# Patient Record
Sex: FEMALE | Race: WHITE | NOT HISPANIC OR LATINO | URBAN - NONMETROPOLITAN AREA
[De-identification: names, ages, dates, MRNs, and addresses within clinical notes are randomized per-mention and may not be internally consistent; named-entity substitution may affect disease eponyms.]

---

## 2019-04-25 ENCOUNTER — APPOINTMENT (RX ONLY)
Dept: URBAN - NONMETROPOLITAN AREA CLINIC 13 | Facility: CLINIC | Age: 69
Setting detail: DERMATOLOGY
End: 2019-04-25

## 2019-04-25 DIAGNOSIS — Z41.9 ENCOUNTER FOR PROCEDURE FOR PURPOSES OTHER THAN REMEDYING HEALTH STATE, UNSPECIFIED: ICD-10-CM

## 2019-04-25 PROCEDURE — ? COSMETIC CONSULTATION - MICRO-NEEDLING

## 2019-04-25 PROCEDURE — ? MEDICAL CONSULTATION: BBL

## 2019-04-25 PROCEDURE — ? COSMETIC CONSULTATION: BOTULINUM TOXIN

## 2019-04-25 PROCEDURE — ? MEDICAL CONSULTATION: SKIN TIGHTENING

## 2019-05-09 ENCOUNTER — APPOINTMENT (RX ONLY)
Dept: URBAN - NONMETROPOLITAN AREA CLINIC 13 | Facility: CLINIC | Age: 69
Setting detail: DERMATOLOGY
End: 2019-05-09

## 2019-05-09 DIAGNOSIS — Z41.9 ENCOUNTER FOR PROCEDURE FOR PURPOSES OTHER THAN REMEDYING HEALTH STATE, UNSPECIFIED: ICD-10-CM

## 2019-05-09 PROCEDURE — ? FACIAL

## 2019-05-09 ASSESSMENT — LOCATION ZONE DERM: LOCATION ZONE: FACE

## 2019-05-09 ASSESSMENT — LOCATION DETAILED DESCRIPTION DERM: LOCATION DETAILED: GLABELLA

## 2019-05-09 ASSESSMENT — LOCATION SIMPLE DESCRIPTION DERM: LOCATION SIMPLE: GLABELLA

## 2019-05-09 NOTE — PROCEDURE: FACIAL
Detail Level: Zone
Price (Use Numbers Only, No Special Characters Or $): 85
Treatment Type Override: [place]
Facial Steaming: steamed
Comments (Sticky): simply clean, enzyme peel, b5 mask, ce ferulic, b5 gel, fusion.
Extraction Method: 11 blade and comedo extractor

## 2019-06-20 ENCOUNTER — APPOINTMENT (RX ONLY)
Dept: URBAN - NONMETROPOLITAN AREA CLINIC 13 | Facility: CLINIC | Age: 69
Setting detail: DERMATOLOGY
End: 2019-06-20

## 2019-06-20 DIAGNOSIS — Z41.9 ENCOUNTER FOR PROCEDURE FOR PURPOSES OTHER THAN REMEDYING HEALTH STATE, UNSPECIFIED: ICD-10-CM

## 2019-06-20 PROCEDURE — ? FACIAL

## 2019-06-20 ASSESSMENT — LOCATION SIMPLE DESCRIPTION DERM: LOCATION SIMPLE: GLABELLA

## 2019-06-20 ASSESSMENT — LOCATION DETAILED DESCRIPTION DERM: LOCATION DETAILED: GLABELLA

## 2019-06-20 ASSESSMENT — LOCATION ZONE DERM: LOCATION ZONE: FACE

## 2019-06-20 NOTE — PROCEDURE: FACIAL
Treatment Type Override: [place]
Price (Use Numbers Only, No Special Characters Or $): 85
Facial Steaming: steamed
Detail Level: Zone
Extraction Method: 11 blade and comedo extractor
Comments (Sticky): simply clean, bamboo scrub, firming mask, , ce ferulic, renewal, fusion

## 2020-05-21 ENCOUNTER — APPOINTMENT (RX ONLY)
Dept: URBAN - NONMETROPOLITAN AREA CLINIC 13 | Facility: CLINIC | Age: 70
Setting detail: DERMATOLOGY
End: 2020-05-21

## 2020-05-21 DIAGNOSIS — L01.01 NON-BULLOUS IMPETIGO: ICD-10-CM | Status: IMPROVED

## 2020-05-21 DIAGNOSIS — L82.1 OTHER SEBORRHEIC KERATOSIS: ICD-10-CM

## 2020-05-21 PROCEDURE — 99212 OFFICE O/P EST SF 10 MIN: CPT

## 2020-05-21 PROCEDURE — ? COUNSELING

## 2020-05-21 ASSESSMENT — LOCATION ZONE DERM
LOCATION ZONE: FACE
LOCATION ZONE: EYELID
LOCATION ZONE: NOSE

## 2020-05-21 ASSESSMENT — LOCATION DETAILED DESCRIPTION DERM
LOCATION DETAILED: RIGHT LATERAL CANTHUS
LOCATION DETAILED: RIGHT NARIS
LOCATION DETAILED: LEFT LATERAL EYEBROW
LOCATION DETAILED: LEFT SUPERIOR CENTRAL MALAR CHEEK

## 2020-05-21 ASSESSMENT — LOCATION SIMPLE DESCRIPTION DERM
LOCATION SIMPLE: RIGHT NOSE
LOCATION SIMPLE: LEFT CHEEK
LOCATION SIMPLE: LEFT EYEBROW
LOCATION SIMPLE: RIGHT EYELID

## 2020-05-21 NOTE — PROCEDURE: COUNSELING
Detail Level: Generalized
Patient Specific Counseling (Will Not Stick From Patient To Patient): -pt noted on HPI
Detail Level: Detailed
Patient Specific Counseling (Will Not Stick From Patient To Patient): -pt noted on HPI \\n\\nPlan:\\n\\n-continue Neosporin TID, discussed Mupirocin ointment if refractory to Neosporin